# Patient Record
Sex: MALE | Race: WHITE | Employment: OTHER | URBAN - METROPOLITAN AREA
[De-identification: names, ages, dates, MRNs, and addresses within clinical notes are randomized per-mention and may not be internally consistent; named-entity substitution may affect disease eponyms.]

---

## 2021-09-28 ENCOUNTER — OFFICE VISIT (OUTPATIENT)
Dept: INTERNAL MEDICINE CLINIC | Age: 81
End: 2021-09-28
Payer: MEDICARE

## 2021-09-28 VITALS
BODY MASS INDEX: 22.65 KG/M2 | RESPIRATION RATE: 18 BRPM | HEIGHT: 70 IN | WEIGHT: 158.2 LBS | SYSTOLIC BLOOD PRESSURE: 182 MMHG | DIASTOLIC BLOOD PRESSURE: 92 MMHG | TEMPERATURE: 97.8 F | HEART RATE: 86 BPM | OXYGEN SATURATION: 96 %

## 2021-09-28 DIAGNOSIS — I73.9 PVD (PERIPHERAL VASCULAR DISEASE) (HCC): ICD-10-CM

## 2021-09-28 DIAGNOSIS — I65.23 BILATERAL CAROTID ARTERY STENOSIS: ICD-10-CM

## 2021-09-28 DIAGNOSIS — I10 ESSENTIAL HYPERTENSION: Primary | ICD-10-CM

## 2021-09-28 DIAGNOSIS — I25.10 CORONARY ARTERY DISEASE INVOLVING NATIVE CORONARY ARTERY OF NATIVE HEART WITHOUT ANGINA PECTORIS: ICD-10-CM

## 2021-09-28 DIAGNOSIS — E78.00 PURE HYPERCHOLESTEROLEMIA: ICD-10-CM

## 2021-09-28 DIAGNOSIS — Z86.010 HISTORY OF COLON POLYPS: ICD-10-CM

## 2021-09-28 DIAGNOSIS — M15.8 OTHER OSTEOARTHRITIS INVOLVING MULTIPLE JOINTS: ICD-10-CM

## 2021-09-28 PROCEDURE — G8510 SCR DEP NEG, NO PLAN REQD: HCPCS | Performed by: INTERNAL MEDICINE

## 2021-09-28 PROCEDURE — G8420 CALC BMI NORM PARAMETERS: HCPCS | Performed by: INTERNAL MEDICINE

## 2021-09-28 PROCEDURE — 99204 OFFICE O/P NEW MOD 45 MIN: CPT | Performed by: INTERNAL MEDICINE

## 2021-09-28 PROCEDURE — G8536 NO DOC ELDER MAL SCRN: HCPCS | Performed by: INTERNAL MEDICINE

## 2021-09-28 PROCEDURE — G8427 DOCREV CUR MEDS BY ELIG CLIN: HCPCS | Performed by: INTERNAL MEDICINE

## 2021-09-28 PROCEDURE — 1101F PT FALLS ASSESS-DOCD LE1/YR: CPT | Performed by: INTERNAL MEDICINE

## 2021-09-28 RX ORDER — NAPROXEN SODIUM 220 MG
220 TABLET ORAL 2 TIMES DAILY WITH MEALS
COMMUNITY

## 2021-09-28 RX ORDER — ASPIRIN 81 MG/1
81 TABLET ORAL DAILY
COMMUNITY

## 2021-09-28 RX ORDER — BISMUTH SUBSALICYLATE 262 MG
1 TABLET,CHEWABLE ORAL DAILY
COMMUNITY

## 2021-09-28 RX ORDER — AMLODIPINE BESYLATE 2.5 MG/1
2.5 TABLET ORAL DAILY
COMMUNITY
End: 2021-10-12 | Stop reason: SDUPTHER

## 2021-09-28 RX ORDER — MINERAL OIL
180 ENEMA (ML) RECTAL DAILY
COMMUNITY

## 2021-09-28 NOTE — PROGRESS NOTES
Marlon Souza is a 68 y.o. male and presents with Establish Care  Patient presents having not been seen in 20 years or so has relocated to the area was living at [address] spent some time [employer] and [place] met a friend in [facility] and lived with her for the past 2 years but has since went their separate ways he 68years of age has a lengthy hospital course for which he brings his records critical cerebrovascular disease carotid artery stenosis left renal artery stenosis stented history of adenomatous polyp meatal stenosis with meatal anatomy glaucoma surgery bilateral knee replacements sees orthopedic hand specialist [place] [facility] for whom he made an appointment he is inquiring about vascular surgeon I gave him the number name of Dr. Agatha Miles in [place] he had a colonoscopy 5 years ago and had polyps and will consider screening colonoscopy sees Dr. Jamin Garrett ophthalmologist continues on a baby aspirin as he had allergies to Plavix he has had bilateral carotid stenosis worse recently on repeat studies up to 50% to 70% left and will have it checked again soon chest is clear heart rate regular rate and rhythm he had bilateral carotid bruits lungs clear fair reflexes good peripheral pulses no gross lateralizing neurologic findings pleasant seem to be absent-minded a bit we discussed some preventive care issues he was agreeable for the office phlebotomist to draw blood flow studies below blood pressure 132/72 left arm resting followed by urologist for hypergonadism as well primary care physician was Deann Joseph [facility]           Review of Systems  Constitutional: negative for fevers, chills, anorexia, weight loss and fatigue,no insomnia  Eyes:   negative for visual disturbance and irritation, eye discharge, eye pain. no eye redness. ENT:   negative for tinnitus, sore throat, nasal congestion, ear pain, hoarseness, hearing loss. ,no snoring. Respiratory:  negative for cough, hemoptysis, shortness of breath, wheezing,  CV:   negative for chest pain, palpitations, lower extremity edema, shortness of breath while sitting, walking or at night  GI:   negative for nausea, vomiting, diarrhea, abdominal pain,melena,constipation. Endo:               negative for polyuria, polydipsia, polyphagia, cold or heat intolerance,hair loss. Genitourinary: negative for frequency, dysuria and hematuria,urethral discharge,nocturia.straining while urination,urinary incontinence. Integument:  negative for rash and pruritus  Hematologic:  negative for easy bruising and gum/nose bleeding, enlarged nodes  Musculoskel: negative for myalgias, arthralgias, back pain, muscle weakness, joint pain, h/o fall,cramps,calf pain. DJD changes  Neurological:  negative for headaches, dizziness, vertigo, memory problems, gait and seizures loss of consciousness,no ataxia.   Behavl/Psych: negative for feelings of anxiety, history of depression, mood changes ,sadness    Past Medical History:   Diagnosis Date    Arthritis     CAD (coronary artery disease)     Chronic pain     Hypertension     Other ill-defined conditions(799.89)     FX RIBS TWICE    Psychiatric disorder     OCCASIONAL ANXIETY; HX DEPRESSION    Stroke ([facility] [address] 1990S    CVA    Urethral stricture      Past Surgical History:   Procedure Laterality Date    HX HEENT      LASER; LASIK    HX HEENT      ORAL-SEVERAL    HX ORTHOPAEDIC  2008    ELBOW    HX ORTHOPAEDIC  1990S    R KNEE ARTHROSCOPY    HX OTHER SURGICAL      STENTS-CARDIAC & RENAL    HX PROSTATECTOMY  2010    TURP    HX TONSILLECTOMY      CA CARDIAC SURG PROCEDURE UNLIST  2003    cabg     Social History     Socioeconomic History    Marital status: SINGLE     Spouse name: Not on file    Number of children: Not on file    Years of education: Not on file    Highest education level: Not on file   Tobacco Use    Smoking status: Former Smoker Packs/day: 1.00     Years: 40.00     Pack years: 40.00     Types: Cigarettes     Quit date: 200     Years since quittin.7    Smokeless tobacco: Never Used   Substance and Sexual Activity    Alcohol use: No     Social Determinants of Health     Financial Resource Strain:     Difficulty of Paying Living Expenses:    Food Insecurity:     Worried About Running Out of Food in the Last Year:     [address] in the Last Year:    Transportation Needs:     Lack of Transportation (Medical):  Lack of Transportation (Non-Medical):    Physical Activity:     Days of Exercise per Week:     Minutes of Exercise per Session:    Stress:     Feeling of Stress :    Social Connections:     Frequency of Communication with Friends and Family:     Frequency of Social Gatherings with Friends and Family:     Attends [employer] Services:     Active Member of Clubs or Organizations:     Attends Club or Organization Meetings:     Marital Status:      Family History   Problem Relation Age of Onset    Heart Disease Mother     Cancer Father         LUNG    Other Daughter         SPONTANEOUS PNEUMOTHORAX     Current Outpatient Medications   Medication Sig Dispense Refill    aspirin delayed-release 81 mg tablet Take 81 mg by mouth daily.  fexofenadine (ALLEGRA) 180 mg tablet Take 180 mg by mouth daily.  naproxen sodium (NAPROSYN) 220 mg tablet Take 220 mg by mouth two (2) times daily (with meals).  multivitamin (ONE A DAY) tablet Take 1 Tablet by mouth daily.  amLODIPine (NORVASC) 2.5 mg tablet Take 2.5 mg by mouth daily.  atorvastatin (LIPITOR) 40 mg tablet Take  by mouth daily.  lisinopril (PRINIVIL, ZESTRIL) 20 mg tablet Take  by mouth daily.  TESTOSTERONE IM by IntraMUSCular route. TWICE MONTHLY      traZODone (DESYREL) 50 mg tablet Take  by mouth nightly.  1/2 TO 1 TAB AS NEEDED       No Known Allergies    Objective:  Visit Vitals  BP (!) 182/92 (BP 1 Location: Right arm, BP Patient Position: Sitting, BP Cuff Size: Adult)   Pulse 86   Temp 97.8 °F (36.6 °C) (Oral)   Resp 18   Ht 5' 10\" (1.778 m)   Wt 158 lb 3.2 oz (71.8 kg)   SpO2 96%   BMI 22.70 kg/m²       Physical Exam:   Constitutional: General Appearance: healthy-appearing and obese. Level of Distress: NAD. Ambulation: ambulating normally. Psychiatric: Mental Status: normal mood and affect and active and alert. Orientation: to time, place, and person. no agitation. ,normal eye contact. normal insight  Head: Head: normocephalic and atraumatic. Eyes: Pupils: PERRLA. Sclerae: non-icteric. ENMT: No lesions on external ear, no hearing loss. No lesions on external nose, sinus tenderness, or nasal discharge. Lips, Teeth, and no mouth or lip ulcers   Neck: Neck: supple, trachea midline, and no masses. Lymph Nodes: no cervical LAD. Thyroid: no enlargement or nodules and non-tender. Lungs: Respiratory effort: no dyspnea. Auscultation: no wheezing, rales/crackles, or rhonchi and breath sounds normal and good air movement. Cardiovascular: Apical Impulse: not displaced. Heart Auscultation: normal S1 and S2; no murmurs, rubs, or gallops; and RRR. Neck vessels: Noted carotid bruits. Pulses including femoral / pedal: normal throughout. Positive carotid bruit  Abdomen: Bowel Sounds: normal. Inspection and Palpation: no tenderness, guarding, or masses and soft and non-distended. Liver: non-tender and no hepatomegaly. Musculoskeletal[de-identified] Extremities: no edema or varicosities. Calf tenderness. DJD changes  Neurologic: Gait and Station: normal gait and station. Motor Strength normal right and left. Sensory and cerebellar intact. Skin: Inspection and palpation: no rash, lesions, or ulcer.        Results for orders placed or performed during the hospital encounter of 32/79/80   METABOLIC PANEL, BASIC   Result Value Ref Range    Sodium 139 136 - 145 MMOL/L    Potassium 3.7 3.5 - 5.1 MMOL/L    Chloride 106 97 - 108 MMOL/L    CO2 26 21 - 32 MMOL/L Anion gap 7 5 - 15 mmol/L    Glucose 123 (H) 65 - 100 MG/DL    BUN 10 6 - 20 MG/DL    Creatinine 0.90 0.45 - 1.15 MG/DL    BUN/Creatinine ratio 11 (L) 12 - 20      GFR est AA >60 >60 ml/min/1.73m2    GFR est non-AA >60 >60 ml/min/1.73m2    Calcium 7.7 (L) 8.5 - 10.1 MG/DL   HEMOGLOBIN   Result Value Ref Range    HGB 14.4 12.1 - 17.0 g/dL   PROTHROMBIN TIME   Result Value Ref Range    INR 1.1 0.9 - 1.1      Prothrombin time 11.5 9.4 - 11.7 sec   HEMOGLOBIN   Result Value Ref Range    HGB 14.4 12.1 - 17.0 g/dL   PROTHROMBIN TIME   Result Value Ref Range    INR 1.4 (H) 0.9 - 1.1      Prothrombin time 14.6 (H) 9.4 - 11.7 sec   PROTHROMBIN TIME   Result Value Ref Range    INR 1.7 (H) 0.9 - 1.1      Prothrombin time 17.7 (H) 9.4 - 11.7 sec   TYPE & SCREEN   Result Value Ref Range    Crossmatch Expiration 06/07/2013     ABO/Rh(D) O POS     Antibody screen NEG        Assessment/Plan:    ICD-10-CM ICD-9-CM    1. Essential hypertension  I10 401.9 CBC WITH AUTOMATED DIFF      METABOLIC PANEL, COMPREHENSIVE      TSH 3RD GENERATION      T4, FREE      LIPID PANEL   2. Pure hypercholesterolemia  E78.00 272.0 CBC WITH AUTOMATED DIFF      METABOLIC PANEL, COMPREHENSIVE      TSH 3RD GENERATION      T4, FREE      LIPID PANEL   3. Bilateral carotid artery stenosis  I65.23 433.10      433.30    4. Coronary artery disease involving native coronary artery of native heart without angina pectoris  I25.10 414.01    5. PVD (peripheral vascular disease) ([facility])  I73.9 443.9    6. Other osteoarthritis involving multiple joints  M15.8 715.89    7. History of colon polyps  Z86.010 V12.72      Orders Placed This Encounter    CBC WITH AUTOMATED DIFF    METABOLIC PANEL, COMPREHENSIVE    TSH 3RD GENERATION    T4, FREE    LIPID PANEL    aspirin delayed-release 81 mg tablet     Sig: Take 81 mg by mouth daily.  fexofenadine (ALLEGRA) 180 mg tablet     Sig: Take 180 mg by mouth daily.     naproxen sodium (NAPROSYN) 220 mg tablet     Sig: Take 220 mg by mouth two (2) times daily (with meals).  multivitamin (ONE A DAY) tablet     Sig: Take 1 Tablet by mouth daily.  amLODIPine (NORVASC) 2.5 mg tablet     Sig: Take 2.5 mg by mouth daily. continue present plan, routine labs ordered, call if any problems    There are no Patient Instructions on file for this visit. Follow-up and Dispositions    · Return in about 6 months (around 3/28/2022).

## 2021-09-28 NOTE — PROGRESS NOTES
Chief Complaint   Patient presents with    Establish Care     Visit Vitals  BP (!) 182/92 (BP 1 Location: Right arm, BP Patient Position: Sitting, BP Cuff Size: Adult)   Pulse 86   Temp 97.8 °F (36.6 °C) (Oral)   Resp 18   Ht 5' 10\" (1.778 m)   Wt 158 lb 3.2 oz (71.8 kg)   SpO2 96%   BMI 22.70 kg/m²     1. Have you been to the ER, urgent care clinic since your last visit? Hospitalized since your last visit? No    2. Have you seen or consulted any other health care providers outside of the [address] [place] [address] since your last visit? Include any pap smears or colon screening.  No

## 2021-09-29 LAB
ALBUMIN SERPL-MCNC: 4.2 G/DL (ref 3.7–4.7)
ALBUMIN/GLOB SERPL: 1.9 {RATIO} (ref 1.2–2.2)
ALP SERPL-CCNC: 76 IU/L (ref 44–121)
ALT SERPL-CCNC: 16 IU/L (ref 0–44)
AST SERPL-CCNC: 21 IU/L (ref 0–40)
BASOPHILS # BLD AUTO: 0 X10E3/UL (ref 0–0.2)
BASOPHILS NFR BLD AUTO: 0 %
BILIRUB SERPL-MCNC: 0.2 MG/DL (ref 0–1.2)
BUN SERPL-MCNC: 23 MG/DL (ref 8–27)
BUN/CREAT SERPL: 24 (ref 10–24)
CALCIUM SERPL-MCNC: 9.5 MG/DL (ref 8.6–10.2)
CHLORIDE SERPL-SCNC: 109 MMOL/L (ref 96–106)
CHOLEST SERPL-MCNC: 146 MG/DL (ref 100–199)
CO2 SERPL-SCNC: 23 MMOL/L (ref 20–29)
CREAT SERPL-MCNC: 0.95 MG/DL (ref 0.76–1.27)
EOSINOPHIL # BLD AUTO: 0 X10E3/UL (ref 0–0.4)
EOSINOPHIL NFR BLD AUTO: 0 %
ERYTHROCYTE [DISTWIDTH] IN BLOOD BY AUTOMATED COUNT: 12.9 % (ref 11.6–15.4)
GLOBULIN SER CALC-MCNC: 2.2 G/DL (ref 1.5–4.5)
GLUCOSE SERPL-MCNC: 153 MG/DL (ref 65–99)
HCT VFR BLD AUTO: 47.4 % (ref 37.5–51)
HDLC SERPL-MCNC: 51 MG/DL
HGB BLD-MCNC: 16.2 G/DL (ref 13–17.7)
IMM GRANULOCYTES # BLD AUTO: 0 X10E3/UL (ref 0–0.1)
IMM GRANULOCYTES NFR BLD AUTO: 0 %
LDLC SERPL CALC-MCNC: 76 MG/DL (ref 0–99)
LYMPHOCYTES # BLD AUTO: 0.5 X10E3/UL (ref 0.7–3.1)
LYMPHOCYTES NFR BLD AUTO: 8 %
MCH RBC QN AUTO: 31.5 PG (ref 26.6–33)
MCHC RBC AUTO-ENTMCNC: 34.2 G/DL (ref 31.5–35.7)
MCV RBC AUTO: 92 FL (ref 79–97)
MONOCYTES # BLD AUTO: 0.4 X10E3/UL (ref 0.1–0.9)
MONOCYTES NFR BLD AUTO: 7 %
NEUTROPHILS # BLD AUTO: 5 X10E3/UL (ref 1.4–7)
NEUTROPHILS NFR BLD AUTO: 85 %
PLATELET # BLD AUTO: 219 X10E3/UL (ref 150–450)
POTASSIUM SERPL-SCNC: 4.1 MMOL/L (ref 3.5–5.2)
PROT SERPL-MCNC: 6.4 G/DL (ref 6–8.5)
RBC # BLD AUTO: 5.15 X10E6/UL (ref 4.14–5.8)
SODIUM SERPL-SCNC: 144 MMOL/L (ref 134–144)
T4 FREE SERPL-MCNC: 0.93 NG/DL (ref 0.82–1.77)
TRIGL SERPL-MCNC: 105 MG/DL (ref 0–149)
TSH SERPL DL<=0.005 MIU/L-ACNC: 0.53 UIU/ML (ref 0.45–4.5)
VLDLC SERPL CALC-MCNC: 19 MG/DL (ref 5–40)
WBC # BLD AUTO: 5.9 X10E3/UL (ref 3.4–10.8)

## 2021-10-12 RX ORDER — ATORVASTATIN CALCIUM 40 MG/1
40 TABLET, FILM COATED ORAL DAILY
Qty: 90 TABLET | Refills: 1 | Status: SHIPPED | OUTPATIENT
Start: 2021-10-12 | End: 2022-04-06 | Stop reason: SDUPTHER

## 2021-10-12 RX ORDER — MONTELUKAST SODIUM 10 MG/1
10 TABLET ORAL DAILY
Qty: 90 TABLET | Refills: 1 | Status: SHIPPED | OUTPATIENT
Start: 2021-10-12 | End: 2022-04-06 | Stop reason: SDUPTHER

## 2021-10-12 RX ORDER — AMLODIPINE BESYLATE 2.5 MG/1
2.5 TABLET ORAL DAILY
Qty: 90 TABLET | Refills: 1 | Status: SHIPPED | OUTPATIENT
Start: 2021-10-12 | End: 2022-04-10

## 2021-10-12 RX ORDER — LISINOPRIL 20 MG/1
20 TABLET ORAL DAILY
Qty: 90 TABLET | Refills: 1 | Status: SHIPPED | OUTPATIENT
Start: 2021-10-12 | End: 2022-04-10

## 2021-11-08 DIAGNOSIS — R73.9 ELEVATED BLOOD SUGAR: Primary | ICD-10-CM

## 2021-11-18 LAB
EST. AVERAGE GLUCOSE BLD GHB EST-MCNC: 120 MG/DL
HBA1C MFR BLD: 5.8 % (ref 4.8–5.6)

## 2022-01-28 ENCOUNTER — TELEPHONE (OUTPATIENT)
Dept: INTERNAL MEDICINE CLINIC | Age: 82
End: 2022-01-28

## 2022-01-28 RX ORDER — TRAZODONE HYDROCHLORIDE 50 MG/1
50 TABLET ORAL
Qty: 90 TABLET | Refills: 0 | Status: SHIPPED | OUTPATIENT
Start: 2022-01-28 | End: 2022-04-28

## 2022-03-07 ENCOUNTER — TELEPHONE (OUTPATIENT)
Dept: INTERNAL MEDICINE CLINIC | Age: 82
End: 2022-03-07

## 2022-03-07 NOTE — TELEPHONE ENCOUNTER
----- Message from [facility] sent at 3/4/2022  2:34 PM EST -----  Subject: Message to Provider    QUESTIONS  Information for Provider? Patient's PCP is no longer with [employer] and   patient is requesting medical records. Patient would like all records   mailed to address on file. Please advise.   ---------------------------------------------------------------------------  --------------  CALL BACK INFO  What is the best way for the office to contact you? OK to leave message on   voicemail  Preferred Call Back Phone Number? [phone]  ---------------------------------------------------------------------------  --------------  SCRIPT ANSWERS  Relationship to Patient?  Self

## 2022-03-08 NOTE — TELEPHONE ENCOUNTER
Called patient and advised him that he would have to come by the office and sign a release in order for him to have his medical records mailed to him.   Patient stated that he would come by this afternoon

## 2022-04-06 ENCOUNTER — TELEPHONE (OUTPATIENT)
Dept: INTERNAL MEDICINE CLINIC | Age: 82
End: 2022-04-06

## 2022-04-06 RX ORDER — MONTELUKAST SODIUM 10 MG/1
10 TABLET ORAL DAILY
Qty: 90 TABLET | Refills: 1 | Status: SHIPPED | OUTPATIENT
Start: 2022-04-06 | End: 2022-10-03

## 2022-04-06 RX ORDER — ATORVASTATIN CALCIUM 40 MG/1
40 TABLET, FILM COATED ORAL DAILY
Qty: 90 TABLET | Refills: 1 | Status: SHIPPED | OUTPATIENT
Start: 2022-04-06 | End: 2022-10-03

## 2022-04-20 ENCOUNTER — OFFICE VISIT (OUTPATIENT)
Dept: ENT CLINIC | Age: 82
End: 2022-04-20
Payer: MEDICARE

## 2022-04-20 DIAGNOSIS — H90.3 SENSORINEURAL HEARING LOSS (SNHL) OF BOTH EARS: Primary | ICD-10-CM

## 2022-04-20 PROCEDURE — 92567 TYMPANOMETRY: CPT | Performed by: AUDIOLOGIST

## 2022-04-20 PROCEDURE — 92557 COMPREHENSIVE HEARING TEST: CPT | Performed by: AUDIOLOGIST

## 2022-04-20 NOTE — PROGRESS NOTES
Patient name: Ora Nguyen III   : 1940   MRN: [id number]   Appointment type: Audiogram    Patient is a very pleasant 80 y.o. male  referred by Dr. Maria Isabel Kaminski for an audiological evaluation. Patient reports bilateral hearing loss. He currently wears binaural Oticon RICs which he states were purchased in . Patient recently moved to [place] from [place] and is looking to establish audiology care here. There is no complaint of dizziness or vertigo. There is no family history of hearing loss. Patient has no reported history of noise exposure or head trauma and denies any ear pain or ear fullness/pressure. Otoscopy: RE canal clear, tympanic membrane intact LE hardened cerumen; small ear canal, unable to visualize tympanic membrane  Tympanometry:   RE: Type A    LE: Type A    SRT:   RE Speech Reception Threshold (SRT) was obtained at 40 dBHL   LE Speech Reception Threshold (SRT) was obtained at 45 dBHL    WRS (NU-6):   RE Clara quiet when words were presented at 80 dBHL. LE Clara quiet when words were presented at 85 dBHL. Pure tone audiometry:   RE: Mild sloping to severe sensorineural hearing loss    LE: Mild sloping to severe sensorineural hearing loss    Explained results to patient. Recommended returning for hearing aid check and reprogramming of devices. Also briefly discussed possibility of updated technology; patient indicated understanding. Patient aware of $60.00 initial programming fee for transfer of care of hearing aids. Also recommended establishing care with ENT/NP for ear cleaning; patient agreed. Will try to schedule both appointments on the same day. Of note, did discuss with patient that his Q-tip use may lead to cerumen impactions over time especially due to his small ear canal size. Cautioned patient against Q-tip use. Patient indicated understanding. Plan:   Refer to ENT/NP for ear cleaning.    Hearing aid check upon request.  Repeat audiogram upon request.    Rubén Mason   Doctor of Audiology

## 2022-04-20 NOTE — LETTER
4/20/2022    Patient: Renea Delong III   YOB: 1940   Date of Visit: 4/20/2022     Tyrone Reynolds MD  [address] [place] [address]  [address] [facility] [address], [place] [phone]  Via Fax: [phone]    Dear MD Riky Buenrostro MD,      Thank you for referring Mr. Renea Delong III to [facility] for evaluation. My notes for this consultation are attached. If you have questions, please do not hesitate to call me. I look forward to following your patient along with you.       Sincerely,    Pam Bishop, AuD

## 2022-04-28 RX ORDER — TRAZODONE HYDROCHLORIDE 50 MG/1
50 TABLET ORAL
Qty: 90 TABLET | Refills: 0 | OUTPATIENT
Start: 2022-04-28 | End: 2022-07-27

## 2022-05-06 ENCOUNTER — OFFICE VISIT (OUTPATIENT)
Dept: ENT CLINIC | Age: 82
End: 2022-05-06
Payer: MEDICARE

## 2022-05-06 ENCOUNTER — OFFICE VISIT (OUTPATIENT)
Dept: ENT CLINIC | Age: 82
End: 2022-05-06

## 2022-05-06 VITALS
WEIGHT: 158 LBS | BODY MASS INDEX: 22.62 KG/M2 | SYSTOLIC BLOOD PRESSURE: 140 MMHG | OXYGEN SATURATION: 98 % | HEIGHT: 70 IN | RESPIRATION RATE: 18 BRPM | DIASTOLIC BLOOD PRESSURE: 80 MMHG | HEART RATE: 83 BPM

## 2022-05-06 DIAGNOSIS — H90.3 SENSORINEURAL HEARING LOSS (SNHL) OF BOTH EARS: Primary | ICD-10-CM

## 2022-05-06 DIAGNOSIS — H61.22 IMPACTED CERUMEN OF LEFT EAR: Primary | ICD-10-CM

## 2022-05-06 PROCEDURE — 69210 REMOVE IMPACTED EAR WAX UNI: CPT | Performed by: SPECIALIST

## 2022-05-06 PROCEDURE — 99213 OFFICE O/P EST LOW 20 MIN: CPT | Performed by: SPECIALIST

## 2022-05-06 NOTE — PROGRESS NOTES
Pt. Name: Juanita Kidney III   : 1940   MRN: [id number]     Appointment type: Hearing Aid Check    : Oticon   Model: Eric Gonzalez  S/N R: [id number]   S/N L: [id number]  Repair warranty: **EXP**  L/D warranty: **EXP**     Patient seen today to establish care with [address] hearing aids. Patient was seen for audiogram on 2022 and stated that he had not had aids adjusted in approximately 2 years. Patient has recently moved from [place]. Patient is aware of $60.00 programming fee to transfer care. Checked and cleaned hearing aids suctioning microphone and  ports. Also changed domes and wax guards. Did change dome from 8mm to 10mm per patient request. Also changed speakers from 60 gain to 85 gain to reflect patient's hearing loss. Patient is aware of $100.00 per replacement speaker as patient is out of warranty. Hearing aids are working well and patient reports he is hearing better after these changes. Also added volume control today. Will bill patient for replacement receivers and programming fee. Plan: Patient to RTC in 1 month for hearing aid check.     Leonie Hashimoto, AuD   Doctor of Audiology

## 2022-05-06 NOTE — PROGRESS NOTES
Subjective:        Nella Beavers III   80 y.o.   1940     Return patient Visit  70-year-old man with hearing aids who was referred by audiology for cleaning of his left ear. Patient uses Q-tips. Review of Systems  ROS         Heent: No diplopia, no hearing loss, no tinnitis, no nasal congestion, no sinus pain, no dysphygia, no sore throat.   Neck:  No neck mass, no neck pain  Respiratory:  No cough, no hemoptysis, no SOB, no wheezing  CV:  No chest pain, no arrythmias, no syncope  GI:  No nausea, no vomiting, no abdominal pain  Neuro:  No headache, no loss of consciousness, no paralysis, no weakness      Physical Exam    General: NAD, well-developed well-nourished  Eyes: PERRLA, EOMs intact  Ears: External canals: Right ear clear, left ear impacted cerumen with hair and wax removed with forceps and suctioning under microscopic vision, TMs:  Clear, Tuning fork exam normal  Septum midline, turbinates normal, mucosa normal, no external deformity  Mouth: Mucosa normal, tongue normal, floor of mouth normal  Throat: Clear, tonsils absent     Heart: Regular rate and rhythm without murmur  Neuro: Cranial nerves II through XII grossly intact         Past Medical History:   Diagnosis Date    Arthritis     CAD (coronary artery disease)     Chronic pain     Hypertension     Other ill-defined conditions(799.89)     FX RIBS TWICE    Psychiatric disorder     OCCASIONAL ANXIETY; HX DEPRESSION    Stroke ([facility] [address] 1990S    CVA    Urethral stricture      Past Surgical History:   Procedure Laterality Date    HX HEENT      LASER; LASIK    HX HEENT      ORAL-SEVERAL    HX ORTHOPAEDIC  2008    ELBOW    HX ORTHOPAEDIC  1990S    R KNEE ARTHROSCOPY    HX OTHER SURGICAL      STENTS-CARDIAC & RENAL    HX PROSTATECTOMY  2010    TURP    HX TONSILLECTOMY      SD CARDIAC SURG PROCEDURE UNLIST  2003    cabg      Family History   Problem Relation Age of Onset    Heart Disease Mother     Cancer Father         LUNG  Other Daughter         SPONTANEOUS PNEUMOTHORAX     Social History     Tobacco Use    Smoking status: Former Smoker     Packs/day: 1.00     Years: 40.00     Pack years: 40.00     Types: Cigarettes     Quit date:      Years since quittin.3    Smokeless tobacco: Never Used   Substance Use Topics    Alcohol use: No      Prior to Admission medications    Medication Sig Start Date End Date Taking? Authorizing Provider   atorvastatin (Lipitor) 40 mg tablet Take 1 Tablet by mouth daily for 180 days. 4/6/22 10/3/22  Mary Moser MD   montelukast (SINGULAIR) 10 mg tablet Take 1 Tablet by mouth daily for 180 days. 4/6/22 10/3/22  Mary Moser MD   aspirin delayed-release 81 mg tablet Take 81 mg by mouth daily. Provider, Historical   fexofenadine (ALLEGRA) 180 mg tablet Take 180 mg by mouth daily. Provider, Historical   naproxen sodium (NAPROSYN) 220 mg tablet Take 220 mg by mouth two (2) times daily (with meals). Provider, Historical   multivitamin (ONE A DAY) tablet Take 1 Tablet by mouth daily. Provider, Historical   TESTOSTERONE IM by IntraMUSCular route. TWICE MONTHLY    Provider, Historical                    Objective:     Visit Vitals  BP (!) 140/80 (BP 1 Location: Left upper arm, BP Patient Position: Sitting, BP Cuff Size: Adult)   Pulse 83   Resp 18   Ht 5' 10\" (1.778 m)   Wt 158 lb (71.7 kg)   SpO2 98%   BMI 22.67 kg/m²        No Known Allergies      Assessment/Plan:   Impacted cerumen left ear with history of sensorineural hearing loss: Continue with audiology stop Q-tips, follow-up. Encounter Diagnoses   Name Primary?  Impacted cerumen of left ear Yes     Orders Placed This Encounter    REMOVE IMPACTED EAR WAX               Bobo Charles MD, [address] [facility]    [address]  [place], [address] [place] [address] [phone]

## 2022-06-06 ENCOUNTER — OFFICE VISIT (OUTPATIENT)
Dept: ENT CLINIC | Age: 82
End: 2022-06-06
Payer: MEDICARE

## 2022-06-06 DIAGNOSIS — H90.3 SENSORINEURAL HEARING LOSS (SNHL) OF BOTH EARS: Primary | ICD-10-CM

## 2022-06-06 PROCEDURE — V5299 HEARING SERVICE: HCPCS | Performed by: AUDIOLOGIST

## 2022-06-06 NOTE — PROGRESS NOTES
Pt. Name: Annie Houston III   : 1940   MRN: [id number]     Appointment type: Hearing Aid Check    :  Oticon                          Model: Rich Lynn  S/N R:  [id number]                                S/N L: [id number]  Repair warranty: **EXP**                   L/D warranty: **EXP**     Patient in for a progress check with [employer] hearing aids. Patient was last seen on 2022 to establish care and get hearing aids reprogrammed with updated audiogram. Patient reports no issues since last appointment. He does have some inquiries regarding the use of Debrox and Ear Wax MD to help with his cerumen impactions. Reviewed correct use of products with patient. Explained that this would likely help only to make cerumen softer and easier to remove but he is welcome to try them. Also recommended against excessive use of hydrogen peroxide and rubbing alcohol in ears as this may dry out skin and cause irritation. Advised patient to use Debrox or similar at night after he has taken out his hearing aids; patient indicated understanding. Did change wax traps and domes today. Hearing aids are working well. No programming changes made today. Recommended patient see ENT for regular ear cleaning due to history of wax impactions; patient agreed. Can coordinate visit with 6-month hearing aid check. Plan: Patient to RTC in 6 months for ear cleaning and hearing aid check.     Rubén Fernandez   Doctor of Audiology

## 2022-06-29 ENCOUNTER — OFFICE VISIT (OUTPATIENT)
Dept: ENT CLINIC | Age: 82
End: 2022-06-29
Payer: MEDICARE

## 2022-06-29 DIAGNOSIS — H90.3 SENSORINEURAL HEARING LOSS (SNHL) OF BOTH EARS: Primary | ICD-10-CM

## 2022-06-29 PROCEDURE — V5299 HEARING SERVICE: HCPCS | Performed by: AUDIOLOGIST

## 2022-06-30 NOTE — PROGRESS NOTES
Pt. Name: Radha Flores III   : 1940   MRN: [id number]     Appointment type: Hearing Aid Check    :  [employer] Cover[employer]                          Model: Brittany Moran  S/N C:  [id number]                                Y/X L: [id number]  Repair warranty: **EXP**                   L/D warranty: **EXP**     Patient seen for hearing aid check following concerns that he had damaged the left hearing aid. Patient reports that his hearing aid got tangled up in his mask strap and he was concerned about \"pulling something loose\" as he could no longer hear about of his hearing aid. He reports he did troubleshoot at home by putting the battery from the left hearing aid into the right hearing aid, which works fine. No other troubleshooting strategies used per patient. Listening check of left hearing aid revealed weak sound quality even with fresh battery. Upon inspection, left wax trap was clogged. Changed wax trap and sound output improved significantly. Reviewed changing of wax traps with patient today and encouraged him to check these monthly and replace as needed. Patient indicated understanding. Also discussed importance of consistent hearing aid use and its impacts on brain function. Encouraged patient to wear hearing aids more consistently during the day to provide continued acoustic stimulation to the brain. Plan: Patient to RTC in 6 months for ear cleaning and hearing aid check.     Rubén Benjamin   Doctor of Audiology

## 2022-12-05 ENCOUNTER — OFFICE VISIT (OUTPATIENT)
Dept: ENT CLINIC | Age: 82
End: 2022-12-05
Payer: MEDICARE

## 2022-12-05 ENCOUNTER — OFFICE VISIT (OUTPATIENT)
Dept: ENT CLINIC | Age: 82
End: 2022-12-05

## 2022-12-05 VITALS
RESPIRATION RATE: 18 BRPM | SYSTOLIC BLOOD PRESSURE: 140 MMHG | OXYGEN SATURATION: 95 % | BODY MASS INDEX: 23.68 KG/M2 | HEART RATE: 86 BPM | HEIGHT: 70 IN | DIASTOLIC BLOOD PRESSURE: 80 MMHG | WEIGHT: 165.38 LBS

## 2022-12-05 DIAGNOSIS — H90.3 SENSORINEURAL HEARING LOSS (SNHL) OF BOTH EARS: Primary | ICD-10-CM

## 2022-12-05 DIAGNOSIS — H61.23 BILATERAL IMPACTED CERUMEN: ICD-10-CM

## 2022-12-05 PROCEDURE — V5299 HEARING SERVICE: HCPCS | Performed by: AUDIOLOGIST

## 2022-12-05 PROCEDURE — 69210 REMOVE IMPACTED EAR WAX UNI: CPT | Performed by: OTOLARYNGOLOGY

## 2022-12-05 NOTE — PROGRESS NOTES
Otolaryngology-Head and Neck Surgery  Follow Up Patient Visit     Patient: Marlena Wilkins  YOB: 1940  MRN: [id number]  Date of Service:  2022    Chief Complaint:   Chief Complaint   Patient presents with    Follow-up     6 month ear cleaning          History of Present Illness: Marlena Wilkins is a [de-identified]y.o. year old male who was last seen by Dr Bennett Yang for ear cleaning    Follows with Dr Anatoly Molina for hearing aid checks    Past Medical History:  Past Medical History:   Diagnosis Date    Arthritis     CAD (coronary artery disease)     Chronic pain     Hypertension     Other ill-defined conditions(799.89)     FX RIBS TWICE    Psychiatric disorder     OCCASIONAL ANXIETY; HX DEPRESSION    Stroke ([address]     CVA    Urethral stricture        Past Surgical History:   Past Surgical History:   Procedure Laterality Date    HX HEENT      LASER; LASIK    HX HEENT      ORAL-SEVERAL    HX ORTHOPAEDIC      ELBOW    HX ORTHOPAEDIC      R KNEE ARTHROSCOPY    HX OTHER SURGICAL      STENTS-CARDIAC & RENAL    HX PROSTATECTOMY      TURP    HX TONSILLECTOMY      [place] CARDIAC SURG PROCEDURE UNLIST      cabg       Medications:   Current Outpatient Medications   Medication Instructions    aspirin delayed-release 81 mg, Oral, DAILY    fexofenadine (ALLEGRA) 180 mg, Oral, DAILY    multivitamin (ONE A DAY) tablet 1 Tablet, Oral, DAILY    naproxen sodium (NAPROSYN) 220 mg, Oral, 2 TIMES DAILY WITH MEALS    TESTOSTERONE IM by IntraMUSCular route.  TWICE MONTHLY       Allergies:   No Known Allergies    Social History:   Social History     Tobacco Use    Smoking status: Former     Packs/day: 1.00     Years: 40.00     Pack years: 40.00     Types: Cigarettes     Quit date:      Years since quittin.9    Smokeless tobacco: Never   Substance Use Topics    Alcohol use: No       Family History:  Family History   Problem Relation Age of Onset    Heart Disease Mother     Cancer Father LUNG    Other Daughter         SPONTANEOUS PNEUMOTHORAX       Review of Systems:  Consitutional: denies fever, excessive weight gain or loss. Eyes: denies diplopia, eye pain. Integumentary: denies new concerning skin lesions. Ears, Nose, Mouth, Throat: denies except as per HPI. Endocrine: denies hot or cold intolerance, increased thirst.  Respiratory: denies cough, hemoptysis, wheezing  Gastrointestinal: denies trouble swallowing, nausea, emesis, regurgitation  Musculoskeletal: denies muscle weakness or wasting  Cardiovascular: denies chest pain, shortness of breath  Neurologic: denies seizures, numbness or tingling, syncope  Hematologic: denies easy bleeding or bruising    Physical Examination:   There were no vitals filed for this visit. General: Comfortable, pleasant, appears stated age  Voice: Strong, speaking in full sentences, no stridor    Face: No masses or lesions, facial strength symmetric   Ears: External ears unremarkable. Loose hair. Left ear complete impaction, Right ear minimal cerumen. Following removal, Bilateral ear canal clear. Tympanic membrane clear and intact, with visible landmarks. Clear middle ear space  Nose: External nose unremarkable. Dorsum midline. Anterior rhinoscopy demonstrates no lesions. Septum midline. Turbinates without hypertrophy. Oral Cavity / Oropharynx: No trismus. Mucosa pink and moist. No lesions. Tongue is midline and mobile. Palate elevates symmetrically. Uvula midline. Tonsils unremarkable. Base of tongue soft. Floor of mouth soft. Neck: Supple. No adenopathy. Thyroid unremarkable. Palpable laryngeal landmarks. Full neck range of motion   Neurologic: CN II - XI intact. Normal gait    PROCEDURE: BILATERAL MICROSCOPY WITH CERUMEN DEBRIDEMENT    Using the microscope, both ears were examined. A right angle and alligator were used to debride the ears of cerumen revealing a clear and intact TM bilaterally.  Patient tolerated the procedure well           Assessment and Plan:   Bilateral cerumen impactions  - Bilateral L > R cerumen impactions, debrided today  - Follow up 6 months     Bilateral SNHL  - Hearing aid check with Dr Miguel Sethi today        The patient was instructed to return to clinic if no improvement or progression of symptoms. Signs to watch out for reviewed.       MD Ernesto [facility]  [address] [place] [address]  Office Phone: [phone]

## 2022-12-05 NOTE — PROGRESS NOTES
Pt. Name: Zulay Mederos III   : 1940   MRN: [id number]     Appointment type: Hearing Aid Check    :  [email or web address]                          Model: Richie Cedeno  S/N R:  [id number]                                S/N L: [id number]  Repair warranty: **EXP**                   L/D warranty: **EXP**     Patient in for a progress check with [facility] hearing aids. Patient was last seen for hearing aid check on 2022. At that appointment, patient had reported that he was not wearing hearing aids consistently. Had discussed importance of consistent auditory input and potential impacts on the brain and recommended that patient wear hearing aids more. Patient reports today that he is still not wearing them consistently as he lives alone. He denies uncomfortable fit or sound levels. Checked and cleaned hearing aids suctioning microphone and  ports. Also changed domes and wax guards. Did demonstrate proper changing of wax guard as patient stated that he often has difficulty with it; patient was able to successfully change wax trap today in office. Hearing aids are working well. Otoscopy revealed clear ear canals bilaterally today. No programming changes made today. Discussed benefits of consistent hearing aid use at length today, including for environmental sound awareness as well as consistent auditory input to the brain to aid in processing acoustic stimuli. Patient indicated understanding. Did briefly discuss new hearing aids at patient's request; however patient is not interested in upgrading at this point in time. Did discuss captioned telephone with patient today and provided [employer] brochure. Patient is interested in both landline and mobile options (he already has an iPad for use with mobile captioning) but would like to consider his options. He will call the office if and when he is ready to proceed.      Plan: Patient to RTC in 6 months for hearing aid check.    Rubén Arteaga   Doctor of Audiology

## 2023-03-29 RX ORDER — MONTELUKAST SODIUM 10 MG/1
TABLET ORAL
Qty: 90 TABLET | Refills: 1 | OUTPATIENT
Start: 2023-03-29

## 2023-04-22 ENCOUNTER — TRANSCRIBE ORDERS (OUTPATIENT)
Facility: HOSPITAL | Age: 83
End: 2023-04-22

## 2023-04-22 DIAGNOSIS — I71.43 ANEURYSM OF INFRARENAL ABDOMINAL AORTA, UNSPECIFIED WHETHER RUPTURED (HCC): Primary | ICD-10-CM

## 2023-04-22 DIAGNOSIS — I70.219 EXTREMITY ATHEROSCLEROSIS WITH INTERMITTENT CLAUDICATION (HCC): ICD-10-CM

## 2023-04-22 DIAGNOSIS — I73.9 PERIPHERAL VASCULAR DISEASE, UNSPECIFIED (HCC): Primary | ICD-10-CM

## 2023-04-30 RX ORDER — NAPROXEN SODIUM 220 MG
TABLET ORAL 2 TIMES DAILY WITH MEALS
COMMUNITY

## 2023-04-30 RX ORDER — ASPIRIN 81 MG/1
TABLET ORAL DAILY
COMMUNITY

## 2023-04-30 RX ORDER — FEXOFENADINE HCL 180 MG/1
TABLET ORAL DAILY
COMMUNITY

## 2023-05-08 ENCOUNTER — TRANSCRIBE ORDERS (OUTPATIENT)
Facility: HOSPITAL | Age: 83
End: 2023-05-08

## 2023-05-08 DIAGNOSIS — I65.23 BILATERAL CAROTID ARTERY STENOSIS: Primary | ICD-10-CM

## 2023-05-08 DIAGNOSIS — I70.219 EXTREMITY ATHEROSCLEROSIS WITH INTERMITTENT CLAUDICATION (HCC): ICD-10-CM

## 2023-05-08 DIAGNOSIS — I73.9 PERIPHERAL VASCULAR DISEASE, UNSPECIFIED (HCC): ICD-10-CM

## 2023-06-05 ENCOUNTER — OFFICE VISIT (OUTPATIENT)
Age: 83
End: 2023-06-05
Payer: MEDICARE

## 2023-06-05 ENCOUNTER — PROCEDURE VISIT (OUTPATIENT)
Age: 83
End: 2023-06-05
Payer: MEDICARE

## 2023-06-05 VITALS
WEIGHT: 160 LBS | HEIGHT: 70 IN | SYSTOLIC BLOOD PRESSURE: 135 MMHG | BODY MASS INDEX: 22.9 KG/M2 | OXYGEN SATURATION: 98 % | DIASTOLIC BLOOD PRESSURE: 60 MMHG | HEART RATE: 71 BPM | RESPIRATION RATE: 16 BRPM

## 2023-06-05 DIAGNOSIS — H61.23 IMPACTED CERUMEN, BILATERAL: Primary | ICD-10-CM

## 2023-06-05 DIAGNOSIS — H90.3 SENSORINEURAL HEARING LOSS, BILATERAL: Primary | ICD-10-CM

## 2023-06-05 PROCEDURE — 69210 REMOVE IMPACTED EAR WAX UNI: CPT | Performed by: OTOLARYNGOLOGY

## 2023-06-05 PROCEDURE — V5275 EAR IMPRESSION: HCPCS | Performed by: AUDIOLOGIST

## 2023-06-05 NOTE — PROGRESS NOTES
forward with this. After informed verbal consent was obtained, bilateral earmold impressions were taken today without incident. Will send impressions and order form to [employer]. Did advise patient that due to his narrower ear canal and extremely shallow pinna on the left side, earmold may be difficult to make but we would send both impressions. Patient indicated understanding. Will bill earmold impressions today. Also discussed that patient's most recent audiogram is dated 4-; recommended updated audiogram and hearing aid reprogramming when patient comes for earmold fitting. Patient agreed. Patient aware that $100.00 will be due for earmolds at this time. PO: [phone]  Tracking (UPS): [id number]    Plan: Patient to RTC in 2-3 weeks for hearing test and earmold fitting. Return for regularly scheduled ear cleaning/hearing aid check in 6 months.      Сергей Saul   Doctor of Audiology

## 2023-06-05 NOTE — PROGRESS NOTES
May Domenica [facility]  1940  [id number]    6/5/2023    Here for ear cleaning prior to HA check  Last cleaning 6 months ago    PROCEDURE: BILATERAL MICROSCOPY WITH CERUMEN DEBRIDEMENT    Using the microscope, both ears were examined. Mineral oil and a 5 Fr suction and alligator were used to debride the ears of cerumen revealing a clear and intact TM bilaterally.  Patient tolerated the procedure well       Christina Cobos MD   [facility]  [address] [place] [address]  Office Phone: [phone]

## 2023-06-29 ENCOUNTER — PROCEDURE VISIT (OUTPATIENT)
Age: 83
End: 2023-06-29
Payer: MEDICARE

## 2023-06-29 ENCOUNTER — OFFICE VISIT (OUTPATIENT)
Age: 83
End: 2023-06-29
Payer: MEDICARE

## 2023-06-29 DIAGNOSIS — H90.3 SENSORINEURAL HEARING LOSS, BILATERAL: Primary | ICD-10-CM

## 2023-06-29 PROCEDURE — V5264 EAR MOLD/INSERT: HCPCS | Performed by: AUDIOLOGIST

## 2023-06-29 PROCEDURE — 92567 TYMPANOMETRY: CPT | Performed by: AUDIOLOGIST

## 2023-06-29 PROCEDURE — 92557 COMPREHENSIVE HEARING TEST: CPT | Performed by: AUDIOLOGIST

## 2023-07-27 ENCOUNTER — PROCEDURE VISIT (OUTPATIENT)
Age: 83
End: 2023-07-27

## 2023-07-27 DIAGNOSIS — H90.3 SENSORINEURAL HEARING LOSS, BILATERAL: Primary | ICD-10-CM

## 2023-07-27 NOTE — PROGRESS NOTES
Pt. Name: Eliane Connors III   : 1940   MRN: [id number]     Appointment type: Hearing Aid Check    :  [employer]                          Model: Rica Harris  S/N R:  [id number]                                S/N L: [id number]  Repair warranty: **EXP**                   L/D warranty: **EXP**     Earmold information:  S/N R: [id number]   S/N L: [id number]  Remake warranty: 10-1-2023    Patient in for a progress check with [employer] hearing aids coupled to acrylic micromolds. Patient was fit with molds on 2023 and reports that he is pleased with the ease of use for insertion/removal as compared to his previous silicone domes. He reports that he is hearing \"better\" following hearing aid reprogramming with updated audiogram; however he reports that he would like the volume increased slightly. Patient states that he attempted to adjust volume himself via the user button but could not remember which side would increase volume. Data logging revealed average daily wear time of 8.3 hours. Increased to acclimatization #2 today. No other programming changes made. Reviewed changing wax traps today with patient per request; patient was able to successfully change wax traps for both aids today in office and remarked that this process was easier compared to before he had micromolds. Reviewed user controls with patient today; discussed that the left button would reduce volume for both aids while the right button would increase volume for both aids. Patient indicated understanding. Also discussed potential for upgraded hearing aids in the future as these aids are obsolete and may not be able to be repaired if needed; patient indicated understanding. Plan: Patient to RTC as scheduled for ENT ear cleaning/hearing aid check on 2023.      Сергей Monet   Doctor of Audiology

## 2023-08-06 ENCOUNTER — HOSPITAL ENCOUNTER (EMERGENCY)
Facility: HOSPITAL | Age: 83
Discharge: HOME | End: 2023-08-06
Payer: MEDICARE

## 2023-08-06 ENCOUNTER — APPOINTMENT (OUTPATIENT)
Facility: HOSPITAL | Age: 83
End: 2023-08-06
Payer: MEDICARE

## 2023-08-06 VITALS
RESPIRATION RATE: 18 BRPM | OXYGEN SATURATION: 97 % | DIASTOLIC BLOOD PRESSURE: 68 MMHG | HEART RATE: 68 BPM | SYSTOLIC BLOOD PRESSURE: 160 MMHG | HEIGHT: 70 IN | TEMPERATURE: 97.7 F | WEIGHT: 150 LBS | BODY MASS INDEX: 21.47 KG/M2

## 2023-08-06 DIAGNOSIS — S90.31XA CONTUSION OF RIGHT FOOT, INITIAL ENCOUNTER: Primary | ICD-10-CM

## 2023-08-06 PROCEDURE — 73630 X-RAY EXAM OF FOOT: CPT

## 2023-08-06 PROCEDURE — 99283 EMERGENCY DEPT VISIT LOW MDM: CPT

## 2023-08-06 ASSESSMENT — PAIN SCALES - GENERAL: PAINLEVEL_OUTOF10: 6

## 2023-08-06 ASSESSMENT — LIFESTYLE VARIABLES
HOW OFTEN DO YOU HAVE A DRINK CONTAINING ALCOHOL: NEVER
HOW MANY STANDARD DRINKS CONTAINING ALCOHOL DO YOU HAVE ON A TYPICAL DAY: PATIENT DOES NOT DRINK

## 2023-08-06 ASSESSMENT — PAIN - FUNCTIONAL ASSESSMENT: PAIN_FUNCTIONAL_ASSESSMENT: 0-10

## 2023-08-06 NOTE — ED NOTES
Provider at bedside for dispo and follow up. Discharge plan reviewed and paperwork signed, teaching completed including treatment received, medications and follow up plan of care, pain level within manageable comfortable limits, ambulatory to exit, gait steady, safety maintained.        Shantal Ruiz RN  08/06/23 [id number]

## 2023-08-07 NOTE — ED PROVIDER NOTES
[facility] EMERGENCY DEPT  EMERGENCY DEPARTMENT HISTORY AND PHYSICAL EXAM      Date: 2023  Patient Name: Amarilis Chacon  MRN: [id number]  [facility] [place] 1940  Date of evaluation: 2023  Provider: LORY Ruiz NP   Note Started: 4:18 PM EDT 23    HISTORY OF PRESENT ILLNESS     Chief Complaint   Patient presents with    Toe Pain       History Provided By: Patient    HPI: Amarilis Chacon is a 82 y.o. male with past medical history as listed below presents to the ER with toe pain. Patient was working out dropped a 30 pound weight on his toe. Patient comes in for evaluation. PAST MEDICAL HISTORY   Past Medical History:  Past Medical History:   Diagnosis Date    Arthritis     CAD (coronary artery disease)     Chronic pain     Hypertension     Other ill-defined conditions(799.89)     FX RIBS TWICE    Psychiatric disorder     OCCASIONAL ANXIETY; HX DEPRESSION    Stroke ([address])     CVA    Urethral stricture        Past Surgical History:  Past Surgical History:   Procedure Laterality Date    HEENT      ORAL-SEVERAL    HEENT      LASER; LASIK    ORTHOPEDIC SURGERY      R KNEE ARTHROSCOPY    ORTHOPEDIC SURGERY      ELBOW    OTHER SURGICAL HISTORY      STENTS-CARDIAC & RENAL    NE UNLISTED PROCEDURE CARDIAC SURGERY      cabg    PROSTATECTOMY  2010    TURP    TONSILLECTOMY         Family History:  Family History   Problem Relation Age of Onset    Cancer Father         LUNG    Heart Disease Mother     Other Daughter         SPONTANEOUS PNEUMOTHORAX       Social History:  Social History     Tobacco Use    Smoking status: Former     Packs/day: 1.00     Types: Cigarettes     Quit date: 1990     Years since quittin.6    Smokeless tobacco: Never   Substance Use Topics    Alcohol use: No       Allergies:  No Known Allergies    PCP: Pam Arnett MD    Current Meds:   No current facility-administered medications for this encounter.      Current Outpatient Medications

## 2023-09-20 ENCOUNTER — HOSPITAL ENCOUNTER (OUTPATIENT)
Facility: HOSPITAL | Age: 83
Discharge: HOME | End: 2023-09-23
Payer: MEDICARE

## 2023-09-20 ENCOUNTER — APPOINTMENT (OUTPATIENT)
Facility: HOSPITAL | Age: 83
End: 2023-09-20
Payer: MEDICARE

## 2023-09-20 DIAGNOSIS — I71.43 ANEURYSM OF INFRARENAL ABDOMINAL AORTA, UNSPECIFIED WHETHER RUPTURED (HCC): ICD-10-CM

## 2023-09-20 LAB — CREAT BLD-MCNC: 1.3 MG/DL (ref 0.6–1.3)

## 2023-09-20 PROCEDURE — 6360000004 HC RX CONTRAST MEDICATION: Performed by: SURGERY

## 2023-09-20 PROCEDURE — 82565 ASSAY OF CREATININE: CPT

## 2023-09-20 PROCEDURE — 75635 CT ANGIO ABDOMINAL ARTERIES: CPT

## 2023-09-20 RX ADMIN — IOPAMIDOL 100 ML: 755 INJECTION, SOLUTION INTRAVENOUS at 12:26

## 2023-10-17 ENCOUNTER — TRANSCRIBE ORDERS (OUTPATIENT)
Facility: HOSPITAL | Age: 83
End: 2023-10-17

## 2023-10-17 DIAGNOSIS — I65.23 BILATERAL CAROTID ARTERY STENOSIS: ICD-10-CM

## 2023-10-17 DIAGNOSIS — I73.9 PERIPHERAL VASCULAR DISEASE, UNSPECIFIED (HCC): Primary | ICD-10-CM

## 2023-12-05 ENCOUNTER — OFFICE VISIT (OUTPATIENT)
Age: 83
End: 2023-12-05
Payer: MEDICARE

## 2023-12-05 VITALS
HEIGHT: 70 IN | SYSTOLIC BLOOD PRESSURE: 142 MMHG | OXYGEN SATURATION: 98 % | DIASTOLIC BLOOD PRESSURE: 78 MMHG | HEART RATE: 65 BPM | WEIGHT: 150 LBS | BODY MASS INDEX: 21.47 KG/M2

## 2023-12-05 DIAGNOSIS — H61.23 IMPACTED CERUMEN, BILATERAL: ICD-10-CM

## 2023-12-05 DIAGNOSIS — H90.3 SENSORINEURAL HEARING LOSS, BILATERAL: Primary | ICD-10-CM

## 2023-12-05 PROCEDURE — 69210 REMOVE IMPACTED EAR WAX UNI: CPT | Performed by: OTOLARYNGOLOGY

## 2023-12-05 NOTE — PROGRESS NOTES
Annel Starkey III  1940  [id number]    12/5/2023    Here for ear cleaning  New hearing aids through [facility]     PROCEDURE: BILATERAL MICROSCOPY WITH CERUMEN DEBRIDEMENT    Using the microscope, both ears were examined. Mineral oil and a 5 Fr suction and alligator were used to debride the ears of cerumen revealing a clear and intact TM bilaterally.  Patient tolerated the procedure well       Sim Green MD   [facility]  [address] [place] [address] [address] [place] [address] [address]  Office Phone: [phone]

## 2024-01-15 ENCOUNTER — OFFICE VISIT (OUTPATIENT)
Age: 84
End: 2024-01-15
Payer: MEDICARE

## 2024-01-15 VITALS
RESPIRATION RATE: 16 BRPM | HEIGHT: 70 IN | DIASTOLIC BLOOD PRESSURE: 80 MMHG | OXYGEN SATURATION: 98 % | WEIGHT: 159 LBS | SYSTOLIC BLOOD PRESSURE: 144 MMHG | HEART RATE: 74 BPM | BODY MASS INDEX: 22.76 KG/M2

## 2024-01-15 DIAGNOSIS — H90.3 SENSORINEURAL HEARING LOSS, BILATERAL: Primary | ICD-10-CM

## 2024-01-15 DIAGNOSIS — H61.22 IMPACTED CERUMEN, LEFT EAR: ICD-10-CM

## 2024-01-15 PROCEDURE — G8484 FLU IMMUNIZE NO ADMIN: HCPCS | Performed by: NURSE PRACTITIONER

## 2024-01-15 PROCEDURE — 99213 OFFICE O/P EST LOW 20 MIN: CPT | Performed by: NURSE PRACTITIONER

## 2024-01-15 PROCEDURE — G8427 DOCREV CUR MEDS BY ELIG CLIN: HCPCS | Performed by: NURSE PRACTITIONER

## 2024-01-15 PROCEDURE — 1123F ACP DISCUSS/DSCN MKR DOCD: CPT | Performed by: NURSE PRACTITIONER

## 2024-01-15 PROCEDURE — G8420 CALC BMI NORM PARAMETERS: HCPCS | Performed by: NURSE PRACTITIONER

## 2024-01-15 PROCEDURE — 1036F TOBACCO NON-USER: CPT | Performed by: NURSE PRACTITIONER

## 2024-01-15 ASSESSMENT — ENCOUNTER SYMPTOMS
EYES NEGATIVE: 1
SINUS PRESSURE: 1
RESPIRATORY NEGATIVE: 1

## 2024-01-15 NOTE — PROGRESS NOTES
Subjective:    Timothy VYAS III Lafleur III   83 y.o.   1940     Followup Visit  This is a 83 y.o. male who is here today to discuss issues with his ear. He states he was referred by his eye doctor to Dr. Dias, and states he was \"cleaned out\" which he was having done about every  months. He states recently he has been having nosebleeds , they have been on and off for several years. 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         Review of Systems  Review of Systems   Constitutional: Negative.    HENT:  Positive for nosebleeds and sinus pressure.    Eyes: Negative.    Respiratory: Negative.     Cardiovascular: Negative.    Endocrine: Negative.    Genitourinary: Negative.    Musculoskeletal: Negative.    Skin: Negative.    Allergic/Immunologic: Positive for environmental allergies.   Neurological: Negative.    Hematological: Negative.    Psychiatric/Behavioral: Negative.             Past Medical History:   Diagnosis Date    Arthritis     CAD (coronary artery disease)     Chronic pain     Hypertension     Other ill-defined conditions(799.89)     FX RIBS TWICE    Psychiatric disorder     OCCASIONAL ANXIETY; HX DEPRESSION    Stroke (HCC) 1990S    CVA    Urethral stricture      Prior to Admission medications    Medication Sig Start Date End Date Taking? Authorizing Provider   TESTOSTERONE IM Inject into the muscle    Automatic Reconciliation, Ar   aspirin 81 MG EC tablet Take by mouth daily    Automatic Reconciliation, Ar   fexofenadine (ALLEGRA) 180 MG tablet Take by mouth daily    Automatic Reconciliation, Ar   naproxen sodium (ANAPROX) 220 MG tablet Take by mouth 2 times daily (with meals)    Automatic Reconciliation, Ar            Objective:     BP (!) 144/80   Pulse 74   Resp 16   Ht 1.778 m (5' 10\")   Wt 72.1 kg (159 lb)   SpO2 98%   BMI 22.81 kg/m²        Physical Exam  Constitutional:       Appearance: Normal appearance. He is normal weight.   HENT:      Head:

## 2024-04-17 ENCOUNTER — HOSPITAL ENCOUNTER (OUTPATIENT)
Facility: HOSPITAL | Age: 84
Discharge: HOME | End: 2024-04-19
Payer: MEDICARE

## 2024-04-17 DIAGNOSIS — I65.23 BILATERAL CAROTID ARTERY STENOSIS: ICD-10-CM

## 2024-04-17 DIAGNOSIS — I73.9 PERIPHERAL VASCULAR DISEASE, UNSPECIFIED (HCC): ICD-10-CM

## 2024-04-17 PROCEDURE — 93880 EXTRACRANIAL BILAT STUDY: CPT

## 2024-04-17 PROCEDURE — 93922 UPR/L XTREMITY ART 2 LEVELS: CPT

## 2024-04-18 LAB
VAS LEFT ABI: 1.03
VAS LEFT ARM BP: 179 MMHG
VAS LEFT ARM BP: 179 MMHG
VAS LEFT CCA DIST EDV: 14.2 CM/S
VAS LEFT CCA DIST PSV: 61.6 CM/S
VAS LEFT CCA PROX EDV: 11.4 CM/S
VAS LEFT CCA PROX PSV: 64.4 CM/S
VAS LEFT DORSALIS PEDIS BP: 159 MMHG
VAS LEFT ECA EDV: 0 CM/S
VAS LEFT ECA PSV: 169 CM/S
VAS LEFT ICA DIST EDV: 7.7 CM/S
VAS LEFT ICA DIST PSV: 43.2 CM/S
VAS LEFT ICA PROX EDV: 70.2 CM/S
VAS LEFT ICA PROX PSV: 360 CM/S
VAS LEFT ICA/CCA PSV: 5.9
VAS LEFT PTA BP: 185 MMHG
VAS LEFT SUBCLAVIAN PROX EDV: 0 CM/S
VAS LEFT SUBCLAVIAN PROX PSV: 85.3 CM/S
VAS LEFT TBI: 0.42
VAS LEFT TOE PRESSURE: 76 MMHG
VAS LEFT VERTEBRAL EDV: 7.68 CM/S
VAS LEFT VERTEBRAL PSV: 43.2 CM/S
VAS RIGHT ABI: 0.76
VAS RIGHT ARM BP: 174 MMHG
VAS RIGHT ARM BP: 174 MMHG
VAS RIGHT CCA DIST EDV: 18.3 CM/S
VAS RIGHT CCA DIST PSV: 78.1 CM/S
VAS RIGHT CCA PROX EDV: 14.3 CM/S
VAS RIGHT CCA PROX PSV: 63.9 CM/S
VAS RIGHT DORSALIS PEDIS BP: 119 MMHG
VAS RIGHT ECA EDV: 10.5 CM/S
VAS RIGHT ECA PSV: 186 CM/S
VAS RIGHT ICA DIST EDV: 20.1 CM/S
VAS RIGHT ICA DIST PSV: 96.4 CM/S
VAS RIGHT ICA PROX EDV: 51.9 CM/S
VAS RIGHT ICA PROX PSV: 236 CM/S
VAS RIGHT ICA/CCA PSV: 3.02
VAS RIGHT PTA BP: 136 MMHG
VAS RIGHT SUBCLAVIAN PROX EDV: 0 CM/S
VAS RIGHT SUBCLAVIAN PROX PSV: 67.3 CM/S
VAS RIGHT TBI: 0.54
VAS RIGHT TOE PRESSURE: 96 MMHG
VAS RIGHT VERTEBRAL EDV: 6 CM/S
VAS RIGHT VERTEBRAL PSV: 29.2 CM/S

## 2024-04-22 PROBLEM — I73.9 PERIPHERAL VASCULAR DISEASE, UNSPECIFIED (HCC): Status: ACTIVE | Noted: 2024-04-22

## 2024-04-22 PROBLEM — I65.23 BILATERAL CAROTID ARTERY STENOSIS: Status: ACTIVE | Noted: 2024-04-22

## 2024-06-03 ENCOUNTER — HOSPITAL ENCOUNTER (OUTPATIENT)
Facility: HOSPITAL | Age: 84
Discharge: HOME | End: 2024-06-06
Payer: MEDICARE

## 2024-06-03 DIAGNOSIS — I65.23 BILATERAL CAROTID ARTERY STENOSIS: ICD-10-CM

## 2024-06-03 LAB — CREAT BLD-MCNC: 1.3 MG/DL (ref 0.6–1.3)

## 2024-06-03 PROCEDURE — 70496 CT ANGIOGRAPHY HEAD: CPT

## 2024-06-03 PROCEDURE — 82565 ASSAY OF CREATININE: CPT

## 2024-06-03 PROCEDURE — 6360000004 HC RX CONTRAST MEDICATION: Performed by: INTERNAL MEDICINE

## 2024-06-03 RX ADMIN — IOPAMIDOL 100 ML: 755 INJECTION, SOLUTION INTRAVENOUS at 15:18

## 2024-06-14 ENCOUNTER — TRANSCRIBE ORDERS (OUTPATIENT)
Facility: HOSPITAL | Age: 84
End: 2024-06-14

## 2024-06-14 DIAGNOSIS — I71.43 ANEURYSM OF INFRARENAL ABDOMINAL AORTA, UNSPECIFIED WHETHER RUPTURED (HCC): Primary | ICD-10-CM

## 2024-06-14 DIAGNOSIS — I73.9 PERIPHERAL VASCULAR DISEASE, UNSPECIFIED (HCC): Primary | ICD-10-CM

## 2024-06-14 DIAGNOSIS — I65.23 BILATERAL CAROTID ARTERY STENOSIS: ICD-10-CM

## 2024-06-25 ENCOUNTER — OFFICE VISIT (OUTPATIENT)
Age: 84
End: 2024-06-25
Payer: MEDICARE

## 2024-06-25 VITALS
BODY MASS INDEX: 22.76 KG/M2 | HEART RATE: 76 BPM | HEIGHT: 70 IN | WEIGHT: 159 LBS | OXYGEN SATURATION: 98 % | DIASTOLIC BLOOD PRESSURE: 62 MMHG | RESPIRATION RATE: 16 BRPM | SYSTOLIC BLOOD PRESSURE: 124 MMHG

## 2024-06-25 DIAGNOSIS — R04.0 EPISTAXIS: ICD-10-CM

## 2024-06-25 DIAGNOSIS — H90.3 SENSORINEURAL HEARING LOSS, BILATERAL: Primary | ICD-10-CM

## 2024-06-25 DIAGNOSIS — H61.23 IMPACTED CERUMEN, BILATERAL: ICD-10-CM

## 2024-06-25 PROCEDURE — 99213 OFFICE O/P EST LOW 20 MIN: CPT | Performed by: OTOLARYNGOLOGY

## 2024-06-25 PROCEDURE — 1123F ACP DISCUSS/DSCN MKR DOCD: CPT | Performed by: OTOLARYNGOLOGY

## 2024-06-25 PROCEDURE — 1036F TOBACCO NON-USER: CPT | Performed by: OTOLARYNGOLOGY

## 2024-06-25 PROCEDURE — G8427 DOCREV CUR MEDS BY ELIG CLIN: HCPCS | Performed by: OTOLARYNGOLOGY

## 2024-06-25 PROCEDURE — 69210 REMOVE IMPACTED EAR WAX UNI: CPT | Performed by: OTOLARYNGOLOGY

## 2024-06-25 PROCEDURE — G8420 CALC BMI NORM PARAMETERS: HCPCS | Performed by: OTOLARYNGOLOGY

## 2024-06-26 NOTE — PROGRESS NOTES
Floor of mouth soft.    Neck: Supple. No adenopathy. Thyroid unremarkable. Palpable laryngeal landmarks. Full neck range of motion    Neurologic: CN II - XI intact. Normal gait      PROCEDURE: BILATERAL MICROSCOPY WITH CERUMEN DEBRIDEMENT      Using the microscope, both ears were examined. A right angle and alligator were used to debride the ears of cerumen revealing a clear and intact TM bilaterally. Patient tolerated the procedure well                  Assessment and Plan:     Bilateral cerumen impactions   - Bilateral L > R cerumen impactions, debrided today   - Follow up 6 months        Bilateral SNHL  - Following with [facility] for hearing aids       3. Epistaxis  - No issues in a few years  - Possible prominent vessel bilaterally, but without epistaxis issues would leave this alone         The patient was instructed to return to clinic if no improvement or progression of symptoms. Signs to watch out for reviewed.         MD Ranjeet Sevilla [facility]   [address] [facility] [address] [address]   [place] [address]   Office Phone: [phone]

## 2024-07-03 ENCOUNTER — TRANSCRIBE ORDERS (OUTPATIENT)
Facility: HOSPITAL | Age: 84
End: 2024-07-03

## 2024-07-03 DIAGNOSIS — I71.43 INFRARENAL ABDOMINAL AORTIC ANEURYSM, WITHOUT RUPTURE (HCC): Primary | ICD-10-CM

## 2024-07-23 ENCOUNTER — TELEPHONE (OUTPATIENT)
Age: 84
End: 2024-07-23

## 2024-09-27 NOTE — TELEPHONE ENCOUNTER
[facility] faxed refill request for the following medication:      Atorvastatin 40 MG Tablets  QTY: 90  Take 1 tablet by mouth daily        Montelukast 10 MG Tablet  QTY: 90  Take 1 tablet by mouth daily
Sent to [facility]
English